# Patient Record
Sex: FEMALE | Race: WHITE | NOT HISPANIC OR LATINO
[De-identification: names, ages, dates, MRNs, and addresses within clinical notes are randomized per-mention and may not be internally consistent; named-entity substitution may affect disease eponyms.]

---

## 2020-11-09 PROBLEM — Z00.00 ENCOUNTER FOR PREVENTIVE HEALTH EXAMINATION: Status: ACTIVE | Noted: 2020-11-09

## 2020-11-10 ENCOUNTER — APPOINTMENT (OUTPATIENT)
Dept: OBGYN | Facility: CLINIC | Age: 35
End: 2020-11-10
Payer: COMMERCIAL

## 2020-11-10 VITALS
DIASTOLIC BLOOD PRESSURE: 75 MMHG | BODY MASS INDEX: 19.7 KG/M2 | WEIGHT: 130 LBS | SYSTOLIC BLOOD PRESSURE: 116 MMHG | HEIGHT: 68 IN

## 2020-11-10 DIAGNOSIS — Z01.419 ENCOUNTER FOR GYNECOLOGICAL EXAMINATION (GENERAL) (ROUTINE) W/OUT ABNORMAL FINDINGS: ICD-10-CM

## 2020-11-10 DIAGNOSIS — Z87.898 PERSONAL HISTORY OF OTHER SPECIFIED CONDITIONS: ICD-10-CM

## 2020-11-10 DIAGNOSIS — Z82.49 FAMILY HISTORY OF ISCHEMIC HEART DISEASE AND OTHER DISEASES OF THE CIRCULATORY SYSTEM: ICD-10-CM

## 2020-11-10 PROCEDURE — 99072 ADDL SUPL MATRL&STAF TM PHE: CPT

## 2020-11-10 PROCEDURE — 99385 PREV VISIT NEW AGE 18-39: CPT

## 2020-11-10 RX ORDER — NORETHINDRONE ACETATE AND ETHINYL ESTRADIOL AND FERROUS FUMARATE 1MG-20(21)
1-20 KIT ORAL
Qty: 84 | Refills: 3 | Status: ACTIVE | COMMUNITY
Start: 2020-11-10 | End: 1900-01-01

## 2020-11-10 NOTE — HISTORY OF PRESENT ILLNESS
[FreeTextEntry1] : pt is a 36 y/o p   lmp uncertain due to ocp presents for new pt annual gyn visit  [Yes] : Patient has concerns regarding sex

## 2020-11-10 NOTE — COUNSELING
[Vitamins/Supplements] : vitamins/supplements [Contraception/ Emergency Contraception/ Safe Sexual Practices] : contraception, emergency contraception, safe sexual practices

## 2020-11-12 LAB
C TRACH RRNA SPEC QL NAA+PROBE: NOT DETECTED
HPV HIGH+LOW RISK DNA PNL CVX: NOT DETECTED
N GONORRHOEA RRNA SPEC QL NAA+PROBE: NOT DETECTED
SOURCE TP AMPLIFICATION: NORMAL

## 2020-11-16 LAB — CYTOLOGY CVX/VAG DOC THIN PREP: NORMAL

## 2020-11-21 ENCOUNTER — TRANSCRIPTION ENCOUNTER (OUTPATIENT)
Age: 35
End: 2020-11-21

## 2023-01-05 ENCOUNTER — EMERGENCY (EMERGENCY)
Facility: HOSPITAL | Age: 38
LOS: 1 days | Discharge: HOME | End: 2023-01-05
Attending: EMERGENCY MEDICINE
Payer: COMMERCIAL

## 2023-01-05 VITALS
DIASTOLIC BLOOD PRESSURE: 67 MMHG | SYSTOLIC BLOOD PRESSURE: 106 MMHG | TEMPERATURE: 99 F | RESPIRATION RATE: 18 BRPM | OXYGEN SATURATION: 98 % | HEART RATE: 75 BPM

## 2023-01-05 VITALS
DIASTOLIC BLOOD PRESSURE: 78 MMHG | WEIGHT: 134.92 LBS | SYSTOLIC BLOOD PRESSURE: 117 MMHG | RESPIRATION RATE: 16 BRPM | HEART RATE: 105 BPM | HEIGHT: 68 IN | TEMPERATURE: 99 F | OXYGEN SATURATION: 99 %

## 2023-01-05 LAB
ALBUMIN SERPL ELPH-MCNC: 3.8 G/DL — SIGNIFICANT CHANGE UP (ref 3.3–5)
ALP SERPL-CCNC: 174 U/L — HIGH (ref 40–120)
ALT FLD-CCNC: 237 U/L — HIGH (ref 10–45)
ANION GAP SERPL CALC-SCNC: 14 MMOL/L — SIGNIFICANT CHANGE UP (ref 5–17)
APTT BLD: 32.1 SEC — SIGNIFICANT CHANGE UP (ref 27.5–35.5)
AST SERPL-CCNC: 152 U/L — HIGH (ref 10–40)
BASE EXCESS BLDV CALC-SCNC: -0.2 MMOL/L — SIGNIFICANT CHANGE UP (ref -2–3)
BASOPHILS # BLD AUTO: 0 K/UL — SIGNIFICANT CHANGE UP (ref 0–0.2)
BASOPHILS NFR BLD AUTO: 0 % — SIGNIFICANT CHANGE UP (ref 0–2)
BILIRUB SERPL-MCNC: 0.2 MG/DL — SIGNIFICANT CHANGE UP (ref 0.2–1.2)
BLD GP AB SCN SERPL QL: NEGATIVE — SIGNIFICANT CHANGE UP
BUN SERPL-MCNC: 5 MG/DL — LOW (ref 7–23)
CA-I SERPL-SCNC: 1.2 MMOL/L — SIGNIFICANT CHANGE UP (ref 1.15–1.33)
CALCIUM SERPL-MCNC: 8.6 MG/DL — SIGNIFICANT CHANGE UP (ref 8.4–10.5)
CHLORIDE BLDV-SCNC: 103 MMOL/L — SIGNIFICANT CHANGE UP (ref 96–108)
CHLORIDE SERPL-SCNC: 105 MMOL/L — SIGNIFICANT CHANGE UP (ref 96–108)
CO2 BLDV-SCNC: 28 MMOL/L — HIGH (ref 22–26)
CO2 SERPL-SCNC: 21 MMOL/L — LOW (ref 22–31)
CREAT SERPL-MCNC: 0.72 MG/DL — SIGNIFICANT CHANGE UP (ref 0.5–1.3)
EGFR: 111 ML/MIN/1.73M2 — SIGNIFICANT CHANGE UP
EOSINOPHIL # BLD AUTO: 0 K/UL — SIGNIFICANT CHANGE UP (ref 0–0.5)
EOSINOPHIL NFR BLD AUTO: 0 % — SIGNIFICANT CHANGE UP (ref 0–6)
GAS PNL BLDV: 136 MMOL/L — SIGNIFICANT CHANGE UP (ref 136–145)
GAS PNL BLDV: SIGNIFICANT CHANGE UP
GAS PNL BLDV: SIGNIFICANT CHANGE UP
GLUCOSE BLDV-MCNC: 103 MG/DL — HIGH (ref 70–99)
GLUCOSE SERPL-MCNC: 115 MG/DL — HIGH (ref 70–99)
HCO3 BLDV-SCNC: 26 MMOL/L — SIGNIFICANT CHANGE UP (ref 22–29)
HCT VFR BLD CALC: 37.7 % — SIGNIFICANT CHANGE UP (ref 34.5–45)
HCT VFR BLDA CALC: 39 % — SIGNIFICANT CHANGE UP (ref 34.5–46.5)
HGB BLD CALC-MCNC: 13.1 G/DL — SIGNIFICANT CHANGE UP (ref 11.7–16.1)
HGB BLD-MCNC: 12.4 G/DL — SIGNIFICANT CHANGE UP (ref 11.5–15.5)
INR BLD: 0.96 RATIO — SIGNIFICANT CHANGE UP (ref 0.88–1.16)
LACTATE BLDV-MCNC: 2 MMOL/L — SIGNIFICANT CHANGE UP (ref 0.5–2)
LYMPHOCYTES # BLD AUTO: 4.66 K/UL — HIGH (ref 1–3.3)
LYMPHOCYTES # BLD AUTO: 53.9 % — HIGH (ref 13–44)
MANUAL SMEAR VERIFICATION: SIGNIFICANT CHANGE UP
MCHC RBC-ENTMCNC: 30.7 PG — SIGNIFICANT CHANGE UP (ref 27–34)
MCHC RBC-ENTMCNC: 32.9 GM/DL — SIGNIFICANT CHANGE UP (ref 32–36)
MCV RBC AUTO: 93.3 FL — SIGNIFICANT CHANGE UP (ref 80–100)
MONOCYTES # BLD AUTO: 0.75 K/UL — SIGNIFICANT CHANGE UP (ref 0–0.9)
MONOCYTES NFR BLD AUTO: 8.7 % — SIGNIFICANT CHANGE UP (ref 2–14)
NEUTROPHILS # BLD AUTO: 2.26 K/UL — SIGNIFICANT CHANGE UP (ref 1.8–7.4)
NEUTROPHILS NFR BLD AUTO: 26.1 % — LOW (ref 43–77)
PCO2 BLDV: 50 MMHG — HIGH (ref 39–42)
PH BLDV: 7.33 — SIGNIFICANT CHANGE UP (ref 7.32–7.43)
PLAT MORPH BLD: NORMAL — SIGNIFICANT CHANGE UP
PLATELET # BLD AUTO: 197 K/UL — SIGNIFICANT CHANGE UP (ref 150–400)
PO2 BLDV: 24 MMHG — LOW (ref 25–45)
POTASSIUM BLDV-SCNC: 3.5 MMOL/L — SIGNIFICANT CHANGE UP (ref 3.5–5.1)
POTASSIUM SERPL-MCNC: 3.8 MMOL/L — SIGNIFICANT CHANGE UP (ref 3.5–5.3)
POTASSIUM SERPL-SCNC: 3.8 MMOL/L — SIGNIFICANT CHANGE UP (ref 3.5–5.3)
PROCALCITONIN SERPL-MCNC: 0.09 NG/ML — SIGNIFICANT CHANGE UP (ref 0.02–0.1)
PROT SERPL-MCNC: 7.4 G/DL — SIGNIFICANT CHANGE UP (ref 6–8.3)
PROTHROM AB SERPL-ACNC: 11.1 SEC — SIGNIFICANT CHANGE UP (ref 10.5–13.4)
RAPID RVP RESULT: SIGNIFICANT CHANGE UP
RBC # BLD: 4.04 M/UL — SIGNIFICANT CHANGE UP (ref 3.8–5.2)
RBC # FLD: 12.8 % — SIGNIFICANT CHANGE UP (ref 10.3–14.5)
RBC BLD AUTO: SIGNIFICANT CHANGE UP
RH IG SCN BLD-IMP: POSITIVE — SIGNIFICANT CHANGE UP
SAO2 % BLDV: 32.1 % — LOW (ref 67–88)
SARS-COV-2 RNA SPEC QL NAA+PROBE: SIGNIFICANT CHANGE UP
SODIUM SERPL-SCNC: 140 MMOL/L — SIGNIFICANT CHANGE UP (ref 135–145)
VARIANT LYMPHS # BLD: 11.3 % — HIGH (ref 0–6)
WBC # BLD: 8.65 K/UL — SIGNIFICANT CHANGE UP (ref 3.8–10.5)
WBC # FLD AUTO: 8.65 K/UL — SIGNIFICANT CHANGE UP (ref 3.8–10.5)

## 2023-01-05 PROCEDURE — 99283 EMERGENCY DEPT VISIT LOW MDM: CPT

## 2023-01-05 PROCEDURE — 86308 HETEROPHILE ANTIBODY SCREEN: CPT

## 2023-01-05 PROCEDURE — 86850 RBC ANTIBODY SCREEN: CPT

## 2023-01-05 PROCEDURE — 85025 COMPLETE CBC W/AUTO DIFF WBC: CPT

## 2023-01-05 PROCEDURE — 87476 LYME DIS DNA AMP PROBE: CPT

## 2023-01-05 PROCEDURE — 84295 ASSAY OF SERUM SODIUM: CPT

## 2023-01-05 PROCEDURE — 85018 HEMOGLOBIN: CPT

## 2023-01-05 PROCEDURE — 84702 CHORIONIC GONADOTROPIN TEST: CPT

## 2023-01-05 PROCEDURE — 86617 LYME DISEASE ANTIBODY: CPT

## 2023-01-05 PROCEDURE — 85610 PROTHROMBIN TIME: CPT

## 2023-01-05 PROCEDURE — 80074 ACUTE HEPATITIS PANEL: CPT

## 2023-01-05 PROCEDURE — 82803 BLOOD GASES ANY COMBINATION: CPT

## 2023-01-05 PROCEDURE — 85730 THROMBOPLASTIN TIME PARTIAL: CPT

## 2023-01-05 PROCEDURE — 82435 ASSAY OF BLOOD CHLORIDE: CPT

## 2023-01-05 PROCEDURE — 85014 HEMATOCRIT: CPT

## 2023-01-05 PROCEDURE — 86757 RICKETTSIA ANTIBODY: CPT

## 2023-01-05 PROCEDURE — 85652 RBC SED RATE AUTOMATED: CPT

## 2023-01-05 PROCEDURE — 82330 ASSAY OF CALCIUM: CPT

## 2023-01-05 PROCEDURE — 86901 BLOOD TYPING SEROLOGIC RH(D): CPT

## 2023-01-05 PROCEDURE — 36415 COLL VENOUS BLD VENIPUNCTURE: CPT

## 2023-01-05 PROCEDURE — 0225U NFCT DS DNA&RNA 21 SARSCOV2: CPT

## 2023-01-05 PROCEDURE — 84145 PROCALCITONIN (PCT): CPT

## 2023-01-05 PROCEDURE — 86618 LYME DISEASE ANTIBODY: CPT

## 2023-01-05 PROCEDURE — 82565 ASSAY OF CREATININE: CPT

## 2023-01-05 PROCEDURE — 82947 ASSAY GLUCOSE BLOOD QUANT: CPT

## 2023-01-05 PROCEDURE — 83605 ASSAY OF LACTIC ACID: CPT

## 2023-01-05 PROCEDURE — 80053 COMPREHEN METABOLIC PANEL: CPT

## 2023-01-05 PROCEDURE — 86900 BLOOD TYPING SEROLOGIC ABO: CPT

## 2023-01-05 PROCEDURE — 84132 ASSAY OF SERUM POTASSIUM: CPT

## 2023-01-05 PROCEDURE — 99284 EMERGENCY DEPT VISIT MOD MDM: CPT

## 2023-01-05 RX ORDER — ACETAMINOPHEN 500 MG
650 TABLET ORAL ONCE
Refills: 0 | Status: COMPLETED | OUTPATIENT
Start: 2023-01-05 | End: 2023-01-05

## 2023-01-05 RX ADMIN — Medication 650 MILLIGRAM(S): at 18:05

## 2023-01-05 NOTE — ED PROVIDER NOTE - CLINICAL SUMMARY MEDICAL DECISION MAKING FREE TEXT BOX
Erick: weeks of fever, body aches, viral testing neg. liver enzymes up. Would send further testing. plts ok. Lab studies ordered, independently reviewed and acted on as appropriate.

## 2023-01-05 NOTE — ED PROVIDER NOTE - ATTENDING CONTRIBUTION TO CARE
I performed a history and physical exam of the patient and discussed their management with the resident and /or advanced care provider. I reviewed the resident and /or ACP's note and agree with the documented findings and plan of care. My medical decision making and observations are found above.  Lungs clear, abd soft, awake and alert

## 2023-01-05 NOTE — ED PROVIDER NOTE - PATIENT PORTAL LINK FT
You can access the FollowMyHealth Patient Portal offered by [facility] by registering at the following website: [email or web address]. By joining [employer]’s FollowMyHealth portal, you will also be able to view your health information using other applications (apps) compatible with our system.

## 2023-01-05 NOTE — ED PROVIDER NOTE - OBJECTIVE STATEMENT
37-year-old with no past medical history presenting with fever.  Patient has had 2 weeks of intermittent fevers T-max 103 today, has been taking Tylenol and Motrin which improved with fevers.  No respiratory or GI symptoms.  Recently noticed a petechial rash on the bilateral shins.  Patient reports feeling slight inguinal lymphadenopathy.  No chest pain, shortness of breath, abdominal pain, nausea, vomiting, diarrhea.

## 2023-01-05 NOTE — ED ADULT NURSE NOTE - OBJECTIVE STATEMENT
37y female to the ED from home via triage c/o of generalized symptoms. Pt reports 2 weeks of body aches and intermittent fevers. Pt also reports feleig increasingly fatigued. Pt had an RVP (flu and covid) which was negative 2 different times at urgent care. Highest temperature was 103 today. Pt reports taking tylenol with minimal relief. Pt developed petechia of lower extremtiies and was sent in by pcp  for further eval. Stretcher in lowest position and locked, appropriate side rails in place, room cleared of clutter and safety hazards, call bell in reach- pt oriented to use, blankets given for comfort.

## 2023-01-05 NOTE — ED PROVIDER NOTE - RAPID ASSESSMENT
Dustin I performed an initial face to face bedside interview with this patient regarding history of present illness and determined that the patient should be evaluated in the main ED. A physical exam was not performed due to private space availability. This patient's evaluation is NOT COMPLETE and only preliminary. The full assessment, management, and reassessment of this patient, including but not limited to the follow up of ordered laboratory and radiologic testing, is deferred to the main ED provider. 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

## 2023-01-05 NOTE — ED PROVIDER NOTE - PROGRESS NOTE DETAILS
Shelia Wood- discussed results and LFTs with pt, plan to send off tick borne illness, hep panel, monospot. pt agrees with plan. feels comfortable with dc.

## 2023-01-05 NOTE — ED PROVIDER NOTE - PHYSICAL EXAMINATION
General appearance: NAD, conversant, afebrile    Eyes: anicteric sclerae, DARA, EOMI   HENT: Atraumatic; oropharynx clear, MMM and no ulcerations, no pharyngeal erythema or exudate   Neck: Trachea midline; Full range of motion, supple, no cervical lymphadenopathy   Pulm: CTA bl, normal respiratory effort and no intercostal retractions, normal work of breathing   CV: RRR, No murmurs, rubs, or gallops. 2+ peripheral pulses.   Abdomen: Soft, non-tender, non-distended; no guarding or rebound   Extremities: No peripheral edema or extremity lymphadenopathy. 5/5 strength in all four extremities.   Skin: bl shins follicular rash, no obvious petechiae. Dry, normal temperature, turgor and texture; no rash, ulcers or subcutaneous nodules   Psych: Appropriate affect, cooperative; alert and oriented to person, place and time

## 2023-01-05 NOTE — ED ADULT TRIAGE NOTE - CHIEF COMPLAINT QUOTE
2 weeks of body aches, intermittent fever, petechia lower extremities, fatigue. Tmax 103 this morning.  negative covid and flu x 2- seen at urgent care x 2  last dose of Tylenol 1100.

## 2023-01-06 LAB
B BURGDOR C6 AB SER-ACNC: ABNORMAL
B BURGDOR IGG+IGM SER-ACNC: 0.99 INDEX — HIGH (ref 0.01–0.89)
ERYTHROCYTE [SEDIMENTATION RATE] IN BLOOD: 12 MM/HR — SIGNIFICANT CHANGE UP (ref 0–15)
HAV IGM SER-ACNC: SIGNIFICANT CHANGE UP
HBV CORE IGM SER-ACNC: REACTIVE
HBV SURFACE AG SER-ACNC: SIGNIFICANT CHANGE UP
HCV AB S/CO SERPL IA: 0.16 S/CO — SIGNIFICANT CHANGE UP (ref 0–0.99)
HCV AB SERPL-IMP: SIGNIFICANT CHANGE UP
HETEROPH AB TITR SER AGGL: NEGATIVE — SIGNIFICANT CHANGE UP
LYME IGG AB: 0.05 INDEX — SIGNIFICANT CHANGE UP (ref 0.01–0.89)
LYME IGG INTERP: NEGATIVE — SIGNIFICANT CHANGE UP
LYME IGM AB: 1.14 INDEX — HIGH (ref 0.01–0.89)
LYME IGM INTERP: POSITIVE

## 2023-01-07 NOTE — ED POST DISCHARGE NOTE - RESULT SUMMARY
1/7/23: HBc IgM positive HBs antigen negative, Lyme IgM positive - concerning for HBV positive acute window period, transaminitis in the ED. d/w Blue attending, will have pt return to ED for further eval and management.

## 2023-01-08 ENCOUNTER — EMERGENCY (EMERGENCY)
Facility: HOSPITAL | Age: 38
LOS: 1 days | Discharge: HOME | End: 2023-01-08
Attending: EMERGENCY MEDICINE
Payer: COMMERCIAL

## 2023-01-08 VITALS
SYSTOLIC BLOOD PRESSURE: 107 MMHG | TEMPERATURE: 98 F | OXYGEN SATURATION: 100 % | HEART RATE: 92 BPM | WEIGHT: 134.92 LBS | DIASTOLIC BLOOD PRESSURE: 70 MMHG | RESPIRATION RATE: 16 BRPM | HEIGHT: 68 IN

## 2023-01-08 LAB
ALBUMIN SERPL ELPH-MCNC: 3.3 G/DL — SIGNIFICANT CHANGE UP (ref 3.3–5)
ALP SERPL-CCNC: 157 U/L — HIGH (ref 40–120)
ALT FLD-CCNC: 125 U/L — HIGH (ref 10–45)
ANION GAP SERPL CALC-SCNC: 10 MMOL/L — SIGNIFICANT CHANGE UP (ref 5–17)
AST SERPL-CCNC: 63 U/L — HIGH (ref 10–40)
BASOPHILS # BLD AUTO: 0 K/UL — SIGNIFICANT CHANGE UP (ref 0–0.2)
BASOPHILS NFR BLD AUTO: 0 % — SIGNIFICANT CHANGE UP (ref 0–2)
BILIRUB SERPL-MCNC: 0.2 MG/DL — SIGNIFICANT CHANGE UP (ref 0.2–1.2)
BUN SERPL-MCNC: 6 MG/DL — LOW (ref 7–23)
CALCIUM SERPL-MCNC: 8.6 MG/DL — SIGNIFICANT CHANGE UP (ref 8.4–10.5)
CHLORIDE SERPL-SCNC: 105 MMOL/L — SIGNIFICANT CHANGE UP (ref 96–108)
CO2 SERPL-SCNC: 25 MMOL/L — SIGNIFICANT CHANGE UP (ref 22–31)
CREAT SERPL-MCNC: 0.58 MG/DL — SIGNIFICANT CHANGE UP (ref 0.5–1.3)
EGFR: 120 ML/MIN/1.73M2 — SIGNIFICANT CHANGE UP
EOSINOPHIL # BLD AUTO: 0.1 K/UL — SIGNIFICANT CHANGE UP (ref 0–0.5)
EOSINOPHIL NFR BLD AUTO: 1 % — SIGNIFICANT CHANGE UP (ref 0–6)
FLUAV AG NPH QL: SIGNIFICANT CHANGE UP
FLUBV AG NPH QL: SIGNIFICANT CHANGE UP
GLUCOSE SERPL-MCNC: 109 MG/DL — HIGH (ref 70–99)
HCT VFR BLD CALC: 33.9 % — LOW (ref 34.5–45)
HGB BLD-MCNC: 11 G/DL — LOW (ref 11.5–15.5)
LYMPHOCYTES # BLD AUTO: 5.15 K/UL — HIGH (ref 1–3.3)
LYMPHOCYTES # BLD AUTO: 52 % — HIGH (ref 13–44)
MANUAL SMEAR VERIFICATION: SIGNIFICANT CHANGE UP
MCHC RBC-ENTMCNC: 30.3 PG — SIGNIFICANT CHANGE UP (ref 27–34)
MCHC RBC-ENTMCNC: 32.4 GM/DL — SIGNIFICANT CHANGE UP (ref 32–36)
MCV RBC AUTO: 93.4 FL — SIGNIFICANT CHANGE UP (ref 80–100)
MONOCYTES # BLD AUTO: 1.29 K/UL — HIGH (ref 0–0.9)
MONOCYTES NFR BLD AUTO: 13 % — SIGNIFICANT CHANGE UP (ref 2–14)
NEUTROPHILS # BLD AUTO: 1.88 K/UL — SIGNIFICANT CHANGE UP (ref 1.8–7.4)
NEUTROPHILS NFR BLD AUTO: 19 % — LOW (ref 43–77)
NRBC # BLD: 0 /100 — SIGNIFICANT CHANGE UP (ref 0–0)
PLAT MORPH BLD: NORMAL — SIGNIFICANT CHANGE UP
PLATELET # BLD AUTO: 185 K/UL — SIGNIFICANT CHANGE UP (ref 150–400)
POTASSIUM SERPL-MCNC: 4 MMOL/L — SIGNIFICANT CHANGE UP (ref 3.5–5.3)
POTASSIUM SERPL-SCNC: 4 MMOL/L — SIGNIFICANT CHANGE UP (ref 3.5–5.3)
PROT SERPL-MCNC: 6.8 G/DL — SIGNIFICANT CHANGE UP (ref 6–8.3)
RBC # BLD: 3.63 M/UL — LOW (ref 3.8–5.2)
RBC # FLD: 13.1 % — SIGNIFICANT CHANGE UP (ref 10.3–14.5)
RBC BLD AUTO: SIGNIFICANT CHANGE UP
RSV RNA NPH QL NAA+NON-PROBE: SIGNIFICANT CHANGE UP
SARS-COV-2 RNA SPEC QL NAA+PROBE: SIGNIFICANT CHANGE UP
SMUDGE CELLS # BLD: PRESENT — SIGNIFICANT CHANGE UP
SODIUM SERPL-SCNC: 140 MMOL/L — SIGNIFICANT CHANGE UP (ref 135–145)
VARIANT LYMPHS # BLD: 15 % — HIGH (ref 0–6)
WBC # BLD: 9.9 K/UL — SIGNIFICANT CHANGE UP (ref 3.8–10.5)
WBC # FLD AUTO: 9.9 K/UL — SIGNIFICANT CHANGE UP (ref 3.8–10.5)

## 2023-01-08 PROCEDURE — 99284 EMERGENCY DEPT VISIT MOD MDM: CPT

## 2023-01-08 PROCEDURE — 99223 1ST HOSP IP/OBS HIGH 75: CPT

## 2023-01-08 RX ORDER — ACETAMINOPHEN 500 MG
975 TABLET ORAL EVERY 6 HOURS
Refills: 0 | Status: DISCONTINUED | OUTPATIENT
Start: 2023-01-08 | End: 2023-01-12

## 2023-01-08 RX ORDER — IBUPROFEN 200 MG
600 TABLET ORAL EVERY 6 HOURS
Refills: 0 | Status: DISCONTINUED | OUTPATIENT
Start: 2023-01-08 | End: 2023-01-12

## 2023-01-08 RX ADMIN — Medication 100 MILLIGRAM(S): at 23:59

## 2023-01-08 NOTE — ED ADULT NURSE NOTE - OBJECTIVE STATEMENT
36 y/o female presents to ED called back for abnormal blood work that was drawn at [facility] 1/5/23. Pt states she has been having fevers/body aches over 10 days, has swabbed negative for covid multiple times. Was called by ED staff 2 days ago and told to return to ED for +hepatitis panel and +lyme disease labs. 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 No travel to areas significant for lyme disease. Pt well- appearing. Ambulatory.

## 2023-01-08 NOTE — ED PROVIDER NOTE - PHYSICAL EXAMINATION
GENERAL: well appearing in no acute distress, non-toxic appearing  HEAD: normocephalic, atraumatic  HEENT: normal conjunctiva, oral mucosa moist, uvula midline  CARDIAC: regular rate and rhythm, normal S1S2, no appreciable murmurs  PULM: normal breath sounds, clear to ascultation bilaterally, no rales, rhonchi, wheezing  GI: abdomen nondistended, soft, nontender  NEURO: moving all 4 extremities, no focal deficits, normal speech, AAOx3   MSK: no peripheral edema, no calf tenderness b/l  SKIN: well-perfused, extremities warm  PSYCH: appropriate mood and affect

## 2023-01-08 NOTE — ED PROVIDER NOTE - OBJECTIVE STATEMENT
37-year-old female without significant past medical history presents with fever, fatigue, Lyme positive, hep B positive, elevated LFTs.  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  Patient reports she has been experiencing significant fevers, fatigue for the last 2 weeks.  Patient has not noticed any yellowing of her eyes or skin.  Otherwise patient has no headache, chest pain, shortness of breath, nausea/vomiting/abdominal pain, dysuria.

## 2023-01-08 NOTE — ED PROVIDER NOTE - PROGRESS NOTE DETAILS
ID consulted.  Instructed to order multiple Hep B and tick borne disease labs.  Confirmed correct orders with ID fellow.  ID to see patient on their rounds tomorrow morning.

## 2023-01-08 NOTE — ED PROVIDER NOTE - CLINICAL SUMMARY MEDICAL DECISION MAKING FREE TEXT BOX
37-year-old female without significant past medical history presents with elevated LFTs, positive Lyme, possible hep B.  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  ID consulted, extensive hepatitis B, Lyme, tickborne illnesses, blood cultures ordered.    ID able to see patient in the morning, likely dispo to CDU.

## 2023-01-08 NOTE — ED PROVIDER NOTE - ATTENDING CONTRIBUTION TO CARE
This is a 37-year-old female who has had 2 weeks of waxing and waning fevers with associated blanching punctate rash to her lower extremities.  Her only travel was to [place] and her employment is as a physician OB/GYN.  She endorses that she is never had any needlesticks recently and that she is hepatitis B vaccinated.  Notably she was seen the other day and had elevated LFTs had fever and had blood sent for line and hepatitis panel.  Hepatitis panel came back with findings concerning for acute hepatitis B and  Came back positive for acute Lyme disease.  Patient is well-appearing and there is a punctate rash that goes not much beyond the knees which blanches.  Is not palpable    Medical decision making–attending  Discussed with infectious disease.  We ordered additional blood testing as per the recommendation including tickborne disease as for example babesiosis as well as additional hepatitis panel testing and blood cultures.  ID recommends to keep the patient in the emergency department for observation and they will see the patient in the morning on consultation.

## 2023-01-09 VITALS
TEMPERATURE: 98 F | OXYGEN SATURATION: 98 % | SYSTOLIC BLOOD PRESSURE: 97 MMHG | DIASTOLIC BLOOD PRESSURE: 57 MMHG | HEART RATE: 75 BPM | RESPIRATION RATE: 18 BRPM

## 2023-01-09 LAB
B BURGDOR C6 AB SER-ACNC: POSITIVE
B BURGDOR IGG+IGM SER-ACNC: 1.34 INDEX — HIGH (ref 0.01–0.89)
HAV IGM SER-ACNC: SIGNIFICANT CHANGE UP
HBV CORE AB SER-ACNC: SIGNIFICANT CHANGE UP
HBV CORE IGM SER-ACNC: REACTIVE
HBV CORE IGM SER-ACNC: REACTIVE
HBV DNA # SERPL NAA+PROBE: SIGNIFICANT CHANGE UP
HBV DNA SERPL NAA+PROBE-LOG#: SIGNIFICANT CHANGE UP LOGIU/ML
HBV E AB SER-ACNC: SIGNIFICANT CHANGE UP
HBV E AG SER-ACNC: SIGNIFICANT CHANGE UP
HBV SURFACE AB SER-ACNC: 97 MIU/ML — SIGNIFICANT CHANGE UP
HBV SURFACE AB SER-ACNC: REACTIVE
HBV SURFACE AG SER-ACNC: SIGNIFICANT CHANGE UP
HBV SURFACE AG SER-ACNC: SIGNIFICANT CHANGE UP
HCV AB S/CO SERPL IA: 0.16 S/CO — SIGNIFICANT CHANGE UP (ref 0–0.99)
HCV AB SERPL-IMP: SIGNIFICANT CHANGE UP
LYME IGG AB: 0.06 INDEX — SIGNIFICANT CHANGE UP (ref 0.01–0.89)
LYME IGG INTERP: NEGATIVE — SIGNIFICANT CHANGE UP
LYME IGM AB: 1.7 INDEX — HIGH (ref 0.01–0.89)
LYME IGM INTERP: POSITIVE
R RICKETTSI AB SER-ACNC: NEGATIVE — SIGNIFICANT CHANGE UP
R RICKETTSI IGM SER-ACNC: 0.42 INDEX — SIGNIFICANT CHANGE UP (ref 0–0.89)
RICK SF IGG TITR SER IF: NEGATIVE — SIGNIFICANT CHANGE UP
RICK SF IGM TITR SER IF: 0.42 INDEX — SIGNIFICANT CHANGE UP (ref 0–0.89)

## 2023-01-09 PROCEDURE — 86705 HEP B CORE ANTIBODY IGM: CPT

## 2023-01-09 PROCEDURE — 86664 EPSTEIN-BARR NUCLEAR ANTIGEN: CPT

## 2023-01-09 PROCEDURE — 99283 EMERGENCY DEPT VISIT LOW MDM: CPT

## 2023-01-09 PROCEDURE — 87476 LYME DIS DNA AMP PROBE: CPT

## 2023-01-09 PROCEDURE — 86704 HEP B CORE ANTIBODY TOTAL: CPT

## 2023-01-09 PROCEDURE — 86707 HEPATITIS BE ANTIBODY: CPT

## 2023-01-09 PROCEDURE — 86706 HEP B SURFACE ANTIBODY: CPT

## 2023-01-09 PROCEDURE — 87517 HEPATITIS B DNA QUANT: CPT

## 2023-01-09 PROCEDURE — G0378: CPT

## 2023-01-09 PROCEDURE — 86663 EPSTEIN-BARR ANTIBODY: CPT

## 2023-01-09 PROCEDURE — 87040 BLOOD CULTURE FOR BACTERIA: CPT

## 2023-01-09 PROCEDURE — 87637 SARSCOV2&INF A&B&RSV AMP PRB: CPT

## 2023-01-09 PROCEDURE — 85025 COMPLETE CBC W/AUTO DIFF WBC: CPT

## 2023-01-09 PROCEDURE — 87468 ANAPLSMA PHGCYTOPHLM AMP PRB: CPT

## 2023-01-09 PROCEDURE — 86780 TREPONEMA PALLIDUM: CPT

## 2023-01-09 PROCEDURE — 86618 LYME DISEASE ANTIBODY: CPT

## 2023-01-09 PROCEDURE — 87799 DETECT AGENT NOS DNA QUANT: CPT

## 2023-01-09 PROCEDURE — 86666 EHRLICHIA ANTIBODY: CPT

## 2023-01-09 PROCEDURE — 87484 EHRLICHA CHAFFEENSIS AMP PRB: CPT

## 2023-01-09 PROCEDURE — 87798 DETECT AGENT NOS DNA AMP: CPT

## 2023-01-09 PROCEDURE — 87350 HEPATITIS BE AG IA: CPT

## 2023-01-09 PROCEDURE — 86617 LYME DISEASE ANTIBODY: CPT

## 2023-01-09 PROCEDURE — 86665 EPSTEIN-BARR CAPSID VCA: CPT

## 2023-01-09 PROCEDURE — 80053 COMPREHEN METABOLIC PANEL: CPT

## 2023-01-09 PROCEDURE — 87340 HEPATITIS B SURFACE AG IA: CPT

## 2023-01-09 PROCEDURE — 86753 PROTOZOA ANTIBODY NOS: CPT

## 2023-01-09 PROCEDURE — 99239 HOSP IP/OBS DSCHRG MGMT >30: CPT

## 2023-01-09 PROCEDURE — 80074 ACUTE HEPATITIS PANEL: CPT

## 2023-01-09 PROCEDURE — 87389 HIV-1 AG W/HIV-1&-2 AB AG IA: CPT

## 2023-01-09 PROCEDURE — 36415 COLL VENOUS BLD VENIPUNCTURE: CPT

## 2023-01-09 RX ADMIN — Medication 975 MILLIGRAM(S): at 00:27

## 2023-01-09 RX ADMIN — Medication 100 MILLIGRAM(S): at 12:56

## 2023-01-09 RX ADMIN — Medication 975 MILLIGRAM(S): at 01:20

## 2023-01-09 NOTE — ED CDU PROVIDER SUBSEQUENT DAY NOTE - ATTENDING APP SHARED VISIT CONTRIBUTION OF CARE
John Mccray MD:   I personally saw the patient and performed a substantive portion of the visit including all aspects of the medical decision making.    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  Patient denies any bug bite, needle sticks, blood transfusions, concern for STI.  Patient states her last travel was to [place] in [place], but had no exposure to ticks or bugs.    In the ED, patient was found to have anemia of 12.4 to down tender to 11 (patient without any report of bleeding) patient also had improvement in transaminitis.  Patient's labs were found to have Lyme antibodies positive, Lyme C6 positive and hepatitis B core IgM antibody reactive.    Patient was placed in the CDU for further monitoring, frequent reassessments, Q4 hour vital signs, ID consultation, doxycycline for Lyme.    Patient was evaluated by me in the morning, and she feels improved symptoms, no fever, lethargy or body aches at this time.  On examination patient with cardiac RRR, lungs CTA B, abdomen soft nontender, no rash.    Pending further labs, and ID consultation. John cMcray MD:   I personally saw the patient and performed a substantive portion of the visit including all aspects of the medical decision making.    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  Patient denies any bug bite, needle sticks, blood transfusions, concern for STI.  Patient states her last travel was to [place] in [place], but had no exposure to ticks or bugs.    In the ED, patient was found to have anemia of 12.4 to down tender to 11 (patient without any report of bleeding) patient also had improvement in transaminitis.  Patient's labs were found to have Lyme antibodies positive, Lyme C6 positive and hepatitis B core IgM antibody reactive.    Patient was placed in the CDU for further monitoring, frequent reassessments, Q4 hour vital signs, ID consultation, doxycycline for Lyme.    Patient was evaluated by me in the morning, and she feels improved symptoms, no fever, lethargy or body aches at this time.  On examination patient with cardiac RRR, lungs CTA B, abdomen soft nontender, no rash.    Patient was seen by infectious disease who made further recommendations for serologic testing and to continue with doxycycline.  And to follow-up with ID in 1 week.    Patient reassessed and has had improved symptoms. Repeat vitals stable. Repeat examination benign. Patient well appearing and non-toxic appearing. Patient tolerating PO. Able to change from laying to sit and standing position and ambulate without symptoms, no assistance needed.     Stable for discharge with close follow-up and strict return precautions. The patient has been informed of all concerning signs and symptoms to return to Emergency Department, the necessity to follow up with PMD within 1-2 days and ID in 1 week was explained, and the patient reports understanding of above with capacity and insight.

## 2023-01-09 NOTE — CONSULT NOTE ADULT - SUBJECTIVE AND OBJECTIVE BOX
"HPI: 37-year-old female without significant past medical history presents with fever, fatigue, Lyme positive, hep B positive, elevated LFTs.  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  Patient reports she has been experiencing significant fevers, fatigue for the last 2 weeks.  Patient has not noticed any yellowing of her eyes or skin.  Otherwise patient has no headache, chest pain, shortness of breath, nausea/vomiting/abdominal pain, dysuria."    Above reviewed. 36 yo F with no PMH presenting with fever over past 14 days. Developed onset of muscle pain, high fevers (up to 103F). Chills.  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 975 milliGRAM(s) Oral every 6 hours PRN  ibuprofen  Tablet. 600 milliGRAM(s) Oral every 6 hours PRN    SOCIAL HISTORY: No tobacco, no alcohol, no illicit drugs    FAMILY HISTORY:  FH: HTN (hypertension) (Father)    FH: hypothyroidism (Mother)    Drug Dosing Weight  Height (cm): 172.7 (08 Jan 2023 12:57)  Weight (kg): 61.2 (08 Jan 2023 12:57)  BMI (kg/m2): 20.5 (08 Jan 2023 12:57)  BSA (m2): 1.73 (08 Jan 2023 12:57)    PE:    Vital Signs Last 24 Hrs  T(C): 36.7 (09 Jan 2023 12:22), Max: 37.3 (09 Jan 2023 00:19)  T(F): 98.1 (09 Jan 2023 12:22), Max: 99.2 (09 Jan 2023 00:19)  HR: 75 (09 Jan 2023 12:22) (65 - 93)  BP: 97/57 (09 Jan 2023 12:22) (93/53 - 108/68)  RR: 18 (09 Jan 2023 12:22) (18 - 18)  SpO2: 98% (09 Jan 2023 12:22) (96% - 98%)    Gen: AOx3, NAD, non-toxic, pleasant  CV: S1+S2 normal, nontachycardic  Resp: Clear bilat, no resp distress, no crackles/wheezes  Abd: Soft, nontender, +BS  Ext: No LE edema, no wounds  : No Richardson  IV/Skin: No thrombophlebitis  Msk: No low back pain, no arthralgias, no joint swelling  Neuro: No sensory deficits, no motor deficits    LABS:                        11.0   9.90  )-----------( 185      ( 08 Jan 2023 18:56 )             33.9     01-08    140  |  105  |  6<L>  ----------------------------<  109<H>  4.0   |  25  |  0.58    Ca    8.6      08 Jan 2023 18:56    TPro  6.8  /  Alb  3.3  /  TBili  0.2  /  DBili  x   /  AST  63<H>  /  ALT  125<H>  /  AlkPhos  157<H>  01-08    MICROBIOLOGY:    Rapid RVP Result: Zoë (01-05 @ 18:32) COVID neg    Lyme C6 positive but low  IgMs positive  IgG negative    Hep B E IgM positive    RADIOLOGY:    None available

## 2023-01-09 NOTE — ED CDU PROVIDER DISPOSITION NOTE - ATTENDING APP SHARED VISIT CONTRIBUTION OF CARE
John Mccray MD:   I personally saw the patient and performed a substantive portion of the visit including all aspects of the medical decision making.    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  Patient denies any bug bite, needle sticks, blood transfusions, concern for STI.  Patient states her last travel was to [place] in [place], but had no exposure to ticks or bugs.    In the ED, patient was found to have anemia of 12.4 to down tender to 11 (patient without any report of bleeding) patient also had improvement in transaminitis.  Patient's labs were found to have Lyme antibodies positive, Lyme C6 positive and hepatitis B core IgM antibody reactive.    Patient was placed in the CDU for further monitoring, frequent reassessments, Q4 hour vital signs, ID consultation, doxycycline for Lyme.    Patient was evaluated by me in the morning, and she feels improved symptoms, no fever, lethargy or body aches at this time.  On examination patient with cardiac RRR, lungs CTA B, abdomen soft nontender, no rash.    Patient was seen by infectious disease who made further recommendations for serologic testing and to continue with doxycycline.  And to follow-up with ID in 1 week.    Patient reassessed and has had improved symptoms. Repeat vitals stable. Repeat examination benign. Patient well appearing and non-toxic appearing. Patient tolerating PO. Able to change from laying to sit and standing position and ambulate without symptoms, no assistance needed.     Stable for discharge with close follow-up and strict return precautions. The patient has been informed of all concerning signs and symptoms to return to Emergency Department, the necessity to follow up with PMD within 1-2 days and ID in 1 week was explained, and the patient reports understanding of above with capacity and insight.

## 2023-01-09 NOTE — ED CDU PROVIDER DISPOSITION NOTE - NSFOLLOWUPINSTRUCTIONS_ED_ALL_ED_FT
1) Follow-up with your primary care provider in 1-2 days.      Follow-up with Infectious Disease within 1 week. Call to confirm your appointment.    [facility] - Infectious Disease  Infectious Disease  [address], [place] [address]  Phone: [phone]    2) Continue doxycycline as prescribed.    3) Fever/Pain can be managed with Acetaminophen (aka Tylenol) and Ibuprofen (aka Motrin or Advil) over the counter as directed. Take with food.    4) Return to the ER for any new or worsening symptoms. 1) Follow-up with your primary care provider in 1-2 days.        Follow-up with Infectious Disease within 1 week. Call to confirm your appointment.  Geovanny Fay MD  [facility] [place] - Infectious Disease  Infectious Disease  [address], [place] [address]  Phone: [phone]    2) Continue doxycycline 100 mg twice daily for 10 days.     3) Fever/Pain can be managed with Acetaminophen (aka Tylenol) and Ibuprofen (aka Motrin or Advil) over the counter as directed. Take with food.    4) Return to the ER for any new or worsening symptoms.

## 2023-01-09 NOTE — ED CDU PROVIDER SUBSEQUENT DAY NOTE - PROGRESS NOTE DETAILS
Patient resting comfortably. No acute distress. Denies any complaints at this time. VSS. Pending ID consult. Will continue to reassess. Marge Murphy PA-C ID evaluated patient at bedside. Recommended further labs. Will order and d/c home. Patient will f/u outpatient in office. CDU attending aware. Agrees with plan. Stable for d/c. Will send home with doxy rx. Marge Murphy PA-C

## 2023-01-09 NOTE — ED CDU PROVIDER INITIAL DAY NOTE - PHYSICAL EXAMINATION
GEN: Pt non-toxic in NAD, alert.  PSYCH: Affect and mood appropriate.  EYES: Sclera white w/o injection.  ENT: MMM. Neck supple. Airway patent.  RESP: CTA b/l, no wheezes, rales, or rhonchi.   CARDIAC: RRR, clear distinct S1, S2, no appreciable murmurs.  ABD: Soft, non-tender.  MSK: ADAMSON. FROM throughout.  NEURO: No focal motor or sensory deficits.  VASC: Strong distal pulses. No edema. No calf tenderness.  SKIN: Faint petechial rash b/l shins. No rashes or lesions.

## 2023-01-09 NOTE — ED ADULT NURSE REASSESSMENT NOTE - NS ED NURSE REASSESS COMMENT FT1
15.30  Pt is evaluated by CDU MD Mahendra Lopez . pt is feeling better.  Pt is discharged . Ml out   GARTH Bird  explained the follow up care & gave the discharge summary  . Pt has stable vitals steady gait A&OX 4 at the time of Discharge
Pt received from SUZETTE Javier. Pt oriented to CDU & plan of care was discussed. Pt A&O x 4. Pt in CDU for ID consult, Doxy q12. Pt denies any chills, fever, abdominal pain, nausea, or vomiting. V/S stable, pt afebrile,  IV in place, patent and free of signs of infiltration. Pt resting in bed. Safety & comfort measures maintained. Call bell in reach. Will continue to monitor.
07.00 Am Received the Pt from  SUZETTE Mathis . Pt is Observed for lyme disease for ID consult. Received the Pt A&OX 4 obeys commands Luz Marina N/V/D fever chills cp SOB   Comfort care & safety measures continued  IV site looks clean & dry no signs of infiltration noted pt denies  pain IV site .  Pt is advised to call for help  call bell with in the reach pt verbalized the understanding .  pending CDU  MD donahue . GCS 15/15 A&OX 4 PERRLA  size 3 Strong upper & lower extremities steady gait   No facial droop  No Hand Leg drop denies numbness tingling Continue to monitor

## 2023-01-09 NOTE — CONSULT NOTE ADULT - ASSESSMENT
36 yo F with no PMH presenting with fever over past 14 days  Here, no documented fevers  No leukocytosis but has lymphocytic predominance and reactive lymphocytes  Travel to [place] and [place]  1 sexual partner in the interim  Otherwise no sick contacts  No needlesticks, no other risk factors for hepatitis B  Lyme screening test positive, but is IgM, and IgG is negative  I suspect the lyme test is a false positive, unclear significance regarding the Hep B core IgM  Viral etiology vs other rickettsial in setting of insect bites in [place] vs true Lyme?  Overall,  - Doxycyline 100mg q 12 x 10 days (empiric for rickettsial), would ensure patient not pregnant  - F/U Borrelia PCR, Ehrlicia/Anaplasma PCR, Hep B PCR/Hep B serologies  - Recommend check Oseas Barr Virus PCR Serum, CMV PCR, Oseas Barr serologic test, Babesia PCR, HIV screening test, Syphilis screen  - F/U pending BCXs  - If discharge planning, would draw above labs before discharge (don't need to wait for results); after discharge follow up with me in ID clinic in 1 week for further eval    Geovanny Fay MD  Contact on TEAMS messaging from 9am - 5pm  From 5pm-9am, on weekends, or if no response call [phone]

## 2023-01-09 NOTE — ED CDU PROVIDER SUBSEQUENT DAY NOTE - HISTORY
Pt seen at bedside in NAD, resting comfortably w/o new or evolving complaints. VSS. Plan for ID to see today.

## 2023-01-09 NOTE — ED CDU PROVIDER DISPOSITION NOTE - PATIENT PORTAL LINK FT
You can access the FollowMyHealth Patient Portal offered by [facility] by registering at the following website: [email or web address]. By joining [employer]’s FollowMyHealth portal, you will also be able to view your health information using other applications (apps) compatible with our system.

## 2023-01-09 NOTE — ED CDU PROVIDER INITIAL DAY NOTE - OBJECTIVE STATEMENT
37-year-old female without significant past medical history presents with fever, fatigue, Lyme positive, hep B positive, elevated LFTs.  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  Patient reports she has been experiencing significant fevers, fatigue for the last 2 weeks.  Patient has not noticed any yellowing of her eyes or skin.  Otherwise patient has no headache, chest pain, shortness of breath, nausea/vomiting/abdominal pain, dysuria.  In ED, nrml wbc, plts wnl but low range, elev lymphocytes, transaminitis sightly improved from 1/5, rvp neg. ED team s/w ID who gave lab recs and will see pt in AM. CDU for fever and pain ctrl, will cont oral doxy for lyme, and ID consult.

## 2023-01-09 NOTE — ED CDU PROVIDER INITIAL DAY NOTE - ATTENDING APP SHARED VISIT CONTRIBUTION OF CARE
ATTENDING, Manjit NO: I have personally performed a face to face diagnostic evaluation on this patient.  I have reviewed the ACP note and agree with the history, exam, and plan of care, except as noted here. Progress notes and further evaluation to be reviewed by observation and discharging attending.

## 2023-01-09 NOTE — ED CDU PROVIDER DISPOSITION NOTE - CLINICAL COURSE
37-year-old female without significant past medical history presents with fever, fatigue, Lyme positive, hep B positive, elevated LFTs.  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  Patient reports she has been experiencing significant fevers, fatigue for the last 2 weeks.  Patient has not noticed any yellowing of her eyes or skin.  Otherwise patient has no headache, chest pain, shortness of breath, nausea/vomiting/abdominal pain, dysuria.  In ED, nrml wbc, plts wnl but low range, elev lymphocytes, transaminitis sightly improved from 1/5, rvp neg. ED team s/w ID who gave lab recs and will see pt in AM. CDU for fever and pain ctrl, will cont oral doxy for lyme, and ID consult.  In CDU, 37-year-old female without significant past medical history presents with fever, fatigue, Lyme positive, hep B positive, elevated LFTs.  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  Patient reports she has been experiencing significant fevers, fatigue for the last 2 weeks.  Patient has not noticed any yellowing of her eyes or skin.  Otherwise patient has no headache, chest pain, shortness of breath, nausea/vomiting/abdominal pain, dysuria.  In ED, nrml wbc, plts wnl but low range, elev lymphocytes, transaminitis sightly improved from 1/5, rvp neg. ED team s/w ID who gave lab recs and will see pt in AM. CDU for fever and pain ctrl, will cont oral doxy for lyme, and ID consult.  In CDU, patient evaluated by ID. Had additional blood work sent. Stable for d/ c home with outpatient f/u.

## 2023-01-09 NOTE — ED CDU PROVIDER INITIAL DAY NOTE - NS ED ATTENDING STATEMENT MOD
This was a shared visit with the CAMERON. I reviewed and verified the documentation and independently performed the documented:

## 2023-01-09 NOTE — ED CDU PROVIDER INITIAL DAY NOTE - NSICDXFAMILYHX_GEN_ALL_CORE_FT
FAMILY HISTORY:  Father  Still living? Unknown  FH: HTN (hypertension), Age at diagnosis: Age Unknown    Mother  Still living? Unknown  FH: hypothyroidism, Age at diagnosis: Age Unknown

## 2023-01-10 LAB
BABESIA MICROTI PCR, BLD RESULT: ABNORMAL
CMV DNA CSF QL NAA+PROBE: 2320 — SIGNIFICANT CHANGE UP
CMV DNA SPEC NAA+PROBE-LOG#: 3.37 LOG10IU/ML — HIGH
EBV EA AB SER IA-ACNC: <5 U/ML — SIGNIFICANT CHANGE UP
EBV EA AB TITR SER IF: POSITIVE
EBV EA IGG SER-ACNC: NEGATIVE — SIGNIFICANT CHANGE UP
EBV NA IGG SER IA-ACNC: 55.5 U/ML — HIGH
EBV PATRN SPEC IB-IMP: SIGNIFICANT CHANGE UP
EBV VCA IGG AVIDITY SER QL IA: POSITIVE
EBV VCA IGM SER IA-ACNC: 16.8 U/ML — SIGNIFICANT CHANGE UP
EBV VCA IGM SER IA-ACNC: 208 U/ML — HIGH
EBV VCA IGM TITR FLD: NEGATIVE — SIGNIFICANT CHANGE UP
HBV DNA # SERPL NAA+PROBE: SIGNIFICANT CHANGE UP
HBV DNA SERPL NAA+PROBE-LOG#: SIGNIFICANT CHANGE UP LOGIU/ML
HIV 1+2 AB+HIV1 P24 AG SERPL QL IA: SIGNIFICANT CHANGE UP
T PALLIDUM AB TITR SER: NEGATIVE — SIGNIFICANT CHANGE UP

## 2023-01-10 NOTE — ED POST DISCHARGE NOTE - DETAILS
1/10/23: Pt made aware of results as above. Has no scheduled appt yet w/ ID but states she will call today for apt. Made aware of pending lab results still. Pt acknowledged understanding, all questions answered, appreciative of call. - Ulisses Fraga PA-C. 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 advised will call with any further pending results - Kalpana Schmidt PA-C 1/12/22: I called and spoke to pt about5 remaining CMV results, williamia indetermineant, I also spoke wih ID who states no need for medication would have pt f/u outpt, pt reports they will f/u outpt, all questions answered, pt reports already talking to ID about these results. -Joe Dumont PA-C

## 2023-01-11 LAB
B BURGDOR DNA SPEC QL NAA+PROBE: NEGATIVE — SIGNIFICANT CHANGE UP
B BURGDOR DNA SPEC QL NAA+PROBE: NEGATIVE — SIGNIFICANT CHANGE UP
B MICROTI IGG TITR SER: SIGNIFICANT CHANGE UP
B MICROTI IGM TITR SER: SIGNIFICANT CHANGE UP
EBV DNA SERPL NAA+PROBE-ACNC: SIGNIFICANT CHANGE UP IU/ML
EBVPCR LOG: SIGNIFICANT CHANGE UP LOG10IU/ML

## 2023-01-12 LAB
B MICROTI IGG TITR SER: SIGNIFICANT CHANGE UP
B MICROTI IGM TITR SER: SIGNIFICANT CHANGE UP

## 2023-01-14 LAB
B BURGDOR DNA SPEC QL NAA+PROBE: NEGATIVE — SIGNIFICANT CHANGE UP
CULTURE RESULTS: SIGNIFICANT CHANGE UP
CULTURE RESULTS: SIGNIFICANT CHANGE UP
SPECIMEN SOURCE: SIGNIFICANT CHANGE UP
SPECIMEN SOURCE: SIGNIFICANT CHANGE UP

## 2023-01-15 LAB
A PHAGOCYTOPH IGG TITR SER IF: SIGNIFICANT CHANGE UP TITER
B BURGDOR AB SER QL IA: POSITIVE — SIGNIFICANT CHANGE UP
B MICROTI IGG TITR SER: SIGNIFICANT CHANGE UP TITER
E CHAFFEENSIS IGG TITR SER IF: SIGNIFICANT CHANGE UP TITER

## 2023-01-19 LAB
A PHAGOCYTOPH DNA BLD QL NAA+PROBE: NEGATIVE — SIGNIFICANT CHANGE UP
E CHAFFEENSIS DNA BLD QL NAA+PROBE: NEGATIVE — SIGNIFICANT CHANGE UP
E EWINGII DNA SPEC QL NAA+PROBE: NEGATIVE — SIGNIFICANT CHANGE UP
EHRLICHIA DNA SPEC QL NAA+PROBE: NEGATIVE — SIGNIFICANT CHANGE UP

## 2024-03-26 PROBLEM — Z78.9 OTHER SPECIFIED HEALTH STATUS: Chronic | Status: ACTIVE | Noted: 2023-01-08

## 2024-04-22 ENCOUNTER — ASOB RESULT (OUTPATIENT)
Age: 39
End: 2024-04-22

## 2024-04-22 ENCOUNTER — LABORATORY RESULT (OUTPATIENT)
Age: 39
End: 2024-04-22

## 2024-04-22 ENCOUNTER — APPOINTMENT (OUTPATIENT)
Dept: ANTEPARTUM | Facility: CLINIC | Age: 39
End: 2024-04-22
Payer: COMMERCIAL

## 2024-04-22 ENCOUNTER — APPOINTMENT (OUTPATIENT)
Dept: MATERNAL FETAL MEDICINE | Facility: CLINIC | Age: 39
End: 2024-04-22
Payer: COMMERCIAL

## 2024-04-22 PROCEDURE — 59000 AMNIOCENTESIS DIAGNOSTIC: CPT

## 2024-04-22 PROCEDURE — 76946 ECHO GUIDE FOR AMNIOCENTESIS: CPT

## 2024-04-22 PROCEDURE — 99204 OFFICE O/P NEW MOD 45 MIN: CPT | Mod: 25

## 2024-04-22 PROCEDURE — 76805 OB US >/= 14 WKS SNGL FETUS: CPT

## 2024-04-22 PROCEDURE — 76821 MIDDLE CEREBRAL ARTERY ECHO: CPT

## 2024-04-22 PROCEDURE — ZZZZZ: CPT

## 2024-04-29 LAB
B19V IGG SER QL IA: 5.76 INDEX
B19V IGG+IGM SER-IMP: NORMAL
B19V IGG+IGM SER-IMP: POSITIVE
B19V IGM FLD-ACNC: 0.17 INDEX
B19V IGM SER-ACNC: NEGATIVE

## 2025-06-18 NOTE — ED PROVIDER NOTE - DIFFERENTIAL DIAGNOSIS
Call [phone] between 8:00 am and 4:30 pm to make an appointment with [facility].      EBV, [facility] spotted fever, mono, Hepatitis, thombocytopenia Differential Diagnosis